# Patient Record
Sex: MALE | Race: WHITE
[De-identification: names, ages, dates, MRNs, and addresses within clinical notes are randomized per-mention and may not be internally consistent; named-entity substitution may affect disease eponyms.]

---

## 2018-09-13 ENCOUNTER — HOSPITAL ENCOUNTER (EMERGENCY)
Dept: HOSPITAL 25 - UCCORT | Age: 33
Discharge: HOME | End: 2018-09-13
Payer: COMMERCIAL

## 2018-09-13 VITALS — DIASTOLIC BLOOD PRESSURE: 71 MMHG | SYSTOLIC BLOOD PRESSURE: 136 MMHG

## 2018-09-13 DIAGNOSIS — F17.210: Primary | ICD-10-CM

## 2018-09-13 DIAGNOSIS — B34.9: ICD-10-CM

## 2018-09-13 PROCEDURE — 99212 OFFICE O/P EST SF 10 MIN: CPT

## 2018-09-13 PROCEDURE — 93005 ELECTROCARDIOGRAM TRACING: CPT

## 2018-09-13 PROCEDURE — 87651 STREP A DNA AMP PROBE: CPT

## 2018-09-13 PROCEDURE — 81003 URINALYSIS AUTO W/O SCOPE: CPT

## 2018-09-13 PROCEDURE — 71046 X-RAY EXAM CHEST 2 VIEWS: CPT

## 2018-09-13 PROCEDURE — G0463 HOSPITAL OUTPT CLINIC VISIT: HCPCS

## 2018-09-13 NOTE — RAD
Indication: Chest pain.



2 views of the chest including dual energy PA views demonstrate no mediastinal shift.

Heart is of normal size and configuration. Lung fields are clear.



IMPRESSION: No active cardiopulmonary disease is noted.

## 2018-09-13 NOTE — UC
Respiratory Complaint HPI





- HPI Summary


HPI Summary: 





cough x 4 days


+ chest congestion , chest tightness, sob,


nausea and vomiting, no diarrhea 


no fever, + chills, , urinary frequency 








- History of Current Complaint


Chief Complaint: UCChestPain


Stated Complaint: CONGESTION/VOMITING/SOB


Time Seen by Provider: 09/13/18 09:43


Hx Obtained From: Patient, Family/Caretaker


Onset/Duration: Gradual Onset, Lasting Days - 4, Still Present


Timing: Constant


Severity Initially: Moderate


Severity Currently: Severe


Pain Intensity: 7


Character: Cough: Nonproductive


Aggravating Factors: Exertion, Deep Breaths


Alleviating Factors: Nothing


Associated Signs And Symptoms: Positive: Dyspnea, Chills, Dizziness, URI, Nasal 

Congestion.  Negative: Fever, Wheezing, Hemoptysis, Calf Pain, Calf Swelling, 

Edema





- Allergies/Home Medications


Allergies/Adverse Reactions: 


 Allergies











Allergy/AdvReac Type Severity Reaction Status Date / Time


 


No Known Allergies Allergy   Verified 09/13/18 09:32











Home Medications: 


 Home Medications





Dm Cough Med 1 dose PO Q4H PRN 09/13/18 [History Confirmed 09/13/18]











PMH/Surg Hx/FS Hx/Imm Hx


Previously Healthy: Yes





- Surgical History


Surgical History: Yes


Surgery Procedure, Year, and Place: wisdom teeth extraction





- Family History


Known Family History: 


   Negative: Diabetes





- Social History


Alcohol Use: Occasionally


Substance Use Type: None


Smoking Status (MU): Heavy Every Day Tobacco Smoker


Type: Cigarettes


Amount Used/How Often: 1/2 pack daily


Length of Time of Smoking/Using Tobacco: 6 MOS AGO





Review of Systems


Constitutional: Chills, Fatigue


Skin: Negative


Eyes: Negative


ENT: Sore Throat, Nasal Discharge


Respiratory: Shortness Of Breath, Cough


Cardiovascular: Negative


Gastrointestinal: Vomiting, Nausea


Genitourinary: Negative


Is Patient Immunocompromised?: No


All Other Systems Reviewed And Are Negative: Yes





Physical Exam


Triage Information Reviewed: Yes


Appearance: Well-Nourished, Ill-Appearing


Vital Signs: 


 Initial Vital Signs











Temp  99.2 F   09/13/18 09:21


 


Pulse  96   09/13/18 09:21


 


Resp  18   09/13/18 09:21


 


BP  136/71   09/13/18 09:21


 


Pulse Ox  100   09/13/18 09:21











Vital Signs Reviewed: Yes


Eye Exam: Normal


Eyes: Positive: Conjunctiva Clear


ENT: Positive: Normal ENT inspection, Hearing grossly normal, Pharyngeal 

erythema, TMs normal.  Negative: Nasal drainage


Neck: Positive: Supple, Nontender, No Lymphadenopathy


Respiratory: Positive: Chest non-tender, Lungs clear, Normal breath sounds


Cardiovascular: Positive: RRR, No Murmur, Pulses Normal


Abdominal Exam: Normal


Abdomen Description: Positive: Nontender, Soft.  Negative: CVA Tenderness (R), 

CVA Tenderness (L), Distended, Guarding


Bowel Sounds: Positive: Present


Neurological Exam: Normal


Skin Exam: Normal





UC Diagnostic Evaluation





- Laboratory


O2 Sat by Pulse Oximetry: 100


Diagnostic Studies Comment: chest xray : IMPRESSION: No active cardiopulmonary 

disease is noted.





Respiratory Course/Dx





- Differential Dx/Diagnosis


Provider Diagnoses: viral illness





Discharge





- Sign-Out/Discharge


Documenting (check all that apply): Patient Departure


All imaging exams completed and their final reports reviewed: Yes





- Discharge Plan


Condition: Stable


Disposition: HOME


Prescriptions: 


Ondansetron [Zofran Odt] 8 mg PO Q8H PRN #6 tab


 PRN Reason: Nausea/Vomiting


Patient Education Materials:  Viral Syndrome (ED)


Referrals: 


No Primary Care Phys,NOPCP [Primary Care Provider] - 5 Days





- Billing Disposition and Condition


Condition: STABLE


Disposition: Home

## 2019-11-14 ENCOUNTER — HOSPITAL ENCOUNTER (EMERGENCY)
Dept: HOSPITAL 25 - UCCORT | Age: 34
Discharge: HOME | End: 2019-11-14
Payer: COMMERCIAL

## 2019-11-14 VITALS — SYSTOLIC BLOOD PRESSURE: 118 MMHG | DIASTOLIC BLOOD PRESSURE: 59 MMHG

## 2019-11-14 DIAGNOSIS — R09.81: ICD-10-CM

## 2019-11-14 DIAGNOSIS — J40: Primary | ICD-10-CM

## 2019-11-14 DIAGNOSIS — Z87.891: ICD-10-CM

## 2019-11-14 PROCEDURE — G0463 HOSPITAL OUTPT CLINIC VISIT: HCPCS

## 2019-11-14 PROCEDURE — 99212 OFFICE O/P EST SF 10 MIN: CPT

## 2019-11-14 NOTE — UC
Respiratory Complaint HPI





- HPI Summary


HPI Summary: 





Pt presents with c/o productive cough, malaise X 1 week.  Pt states that his 

wife, and daughter are also sick at home.  Pt's wife is pregnant and due on 11/ 16/19. 





- History of Current Complaint


Chief Complaint: UCGeneralIllness


Stated Complaint: CHEST CONGESTION, COUGH


Time Seen by Provider: 11/14/19 18:00


Hx Obtained From: Patient


Onset/Duration: Gradual Onset, Lasting Weeks - 1, Still Present


Timing: Constant


Severity Initially: Mild


Severity Currently: Moderate


Pain Intensity: 0


Character: Cough: Productive


Aggravating Factors: Exertion, Deep Breaths, Recumbent Position


Alleviating Factors: Nothing


Associated Signs And Symptoms: Positive: Chills, URI, Nasal Congestion





- Risk Factors


Pulmonary Embolism Risk Factors: Negative


Cardiac Risk Factors: Negative


Pseudomonas Risk Factors: Negative


Tuberculosis Risk Factors: Negative





- Allergies/Home Medications


Allergies/Adverse Reactions: 


 Allergies











Allergy/AdvReac Type Severity Reaction Status Date / Time


 


No Known Allergies Allergy   Verified 11/14/19 18:00











Home Medications: 


 Home Medications





Acetaminophen [Tylenol] 2 tab PO ONCE 11/14/19 [History Confirmed 11/14/19]


D-Methorphan/PE/Acetaminophen [Day Time Cold-Flu Liquid] 30 ml PO ONCE 11/14/19 

[History Confirmed 11/14/19]


Dm/Pseudoephed/Acetaminophen [Day-Time Cold-Flu Softgel] 2 tab PO Q4H 11/14/19 [

History Confirmed 11/14/19]











PMH/Surg Hx/FS Hx/Imm Hx


Previously Healthy: Yes





- Surgical History


Surgical History: Yes


Surgery Procedure, Year, and Place: wisdom teeth extraction





- Family History


Known Family History: 


   Negative: Diabetes





- Social History


Occupation: Employed Full-time


Lives: With Family


Alcohol Use: None


Substance Use Type: None


Smoking Status (MU): Former Smoker


Type: Cigarettes


Amount Used/How Often: 1/2 pack daily


Length of Time of Smoking/Using Tobacco: 6 MOS AGO


Have You Smoked in the Last Year: Yes


When Did the Patient Quit Smoking/Using Tobacco: August 2019





Review of Systems


All Other Systems Reviewed And Are Negative: Yes


Constitutional: Positive: Chills, Fatigue


Skin: Positive: Negative


Eyes: Positive: Negative


ENT: Positive: Sinus Congestion


Respiratory: Positive: Cough


Cardiovascular: Positive: Negative


Gastrointestinal: Positive: Negative


Genitourinary: Positive: Negative


Motor: Positive: Negative


Neurovascular: Positive: Negative


Musculoskeletal: Positive: Negative


Neurological: Positive: Negative


Psychological: Positive: Negative


Is Patient Immunocompromised?: No





Physical Exam


Triage Information Reviewed: Yes


Appearance: Ill-Appearing


Vital Signs: 


 Initial Vital Signs











Temp  99 F   11/14/19 18:02


 


Pulse  78   11/14/19 18:02


 


Resp  18   11/14/19 18:02


 


BP  118/59   11/14/19 18:02


 


Pulse Ox  99   11/14/19 18:02











Vital Signs Reviewed: Yes


Eye Exam: Normal


ENT: Positive: Nasal congestion


Dental Exam: Normal


Neck exam: Normal


Respiratory Exam: Normal


Respiratory: Positive: Normal breath sounds


Cardiovascular Exam: Normal


Musculoskeletal Exam: Normal


Neurological Exam: Normal


Psychological Exam: Normal


Skin Exam: Normal





Respiratory Course/Dx





- Course


Course Of Treatment: 





I discussed viral vs bacterial and pt requested antibiotics. I also discussed 

my concern about pt's c/o of diarrhea this morning and the use of antibiotics.  

Pt verbalized understanding and agreed to plan of care





- Differential Dx/Diagnosis


Differential Diagnosis/HQI/PQRI: Bronchitis


Provider Diagnosis: 


 Bronchitis








Discharge ED





- Sign-Out/Discharge


Documenting (check all that apply): Patient Departure


All imaging exams completed and their final reports reviewed: No Studies





- Discharge Plan


Condition: Stable


Disposition: HOME


Prescriptions: 


Azithromycin TAB* [Zithromax TAB (Z-LOIS) 250 mg #6 tabs] 2 tab PO .TODAY, THEN 

1 DAILY #1 lois


Patient Education Materials:  Loperamide (By mouth), Acute Bronchitis (ED)


Referrals: 


[facility] [Outside] - If Needed


No Primary Care Phys,NOPCP [Primary Care Provider] - 


Additional Instructions: 


Please follow up with your PCP as needed. If your symptoms worsen please go 

directly to the closest emergency room. 





- Billing Disposition and Condition


Condition: STABLE


Disposition: Home